# Patient Record
Sex: FEMALE | Race: WHITE | URBAN - METROPOLITAN AREA
[De-identification: names, ages, dates, MRNs, and addresses within clinical notes are randomized per-mention and may not be internally consistent; named-entity substitution may affect disease eponyms.]

---

## 2021-03-31 LAB — HBA1C MFR BLD HPLC: 8.6 %

## 2021-08-18 ENCOUNTER — OFFICE VISIT (OUTPATIENT)
Dept: ENDOCRINOLOGY | Age: 71
End: 2021-08-18
Payer: MEDICARE

## 2021-08-18 VITALS
HEIGHT: 61 IN | SYSTOLIC BLOOD PRESSURE: 115 MMHG | DIASTOLIC BLOOD PRESSURE: 60 MMHG | TEMPERATURE: 97.3 F | OXYGEN SATURATION: 97 % | HEART RATE: 86 BPM | BODY MASS INDEX: 49.84 KG/M2 | WEIGHT: 264 LBS | RESPIRATION RATE: 22 BRPM

## 2021-08-18 DIAGNOSIS — E78.2 MIXED HYPERLIPIDEMIA: ICD-10-CM

## 2021-08-18 DIAGNOSIS — Z79.4 TYPE 2 DIABETES MELLITUS WITH HYPERGLYCEMIA, WITH LONG-TERM CURRENT USE OF INSULIN (HCC): Primary | ICD-10-CM

## 2021-08-18 DIAGNOSIS — I10 ESSENTIAL HYPERTENSION: ICD-10-CM

## 2021-08-18 DIAGNOSIS — E11.65 TYPE 2 DIABETES MELLITUS WITH HYPERGLYCEMIA, WITH LONG-TERM CURRENT USE OF INSULIN (HCC): Primary | ICD-10-CM

## 2021-08-18 LAB
ALBUMIN SERPL-MCNC: 3.1 G/DL (ref 3.5–5)
ALBUMIN/GLOB SERPL: 0.9 {RATIO} (ref 1.1–2.2)
ALP SERPL-CCNC: 125 U/L (ref 45–117)
ALT SERPL-CCNC: 14 U/L (ref 12–78)
ANION GAP SERPL CALC-SCNC: 5 MMOL/L (ref 5–15)
AST SERPL-CCNC: 7 U/L (ref 15–37)
BILIRUB SERPL-MCNC: 0.2 MG/DL (ref 0.2–1)
BUN SERPL-MCNC: 30 MG/DL (ref 6–20)
BUN/CREAT SERPL: 13 (ref 12–20)
CALCIUM SERPL-MCNC: 9.1 MG/DL (ref 8.5–10.1)
CHLORIDE SERPL-SCNC: 104 MMOL/L (ref 97–108)
CHOLEST SERPL-MCNC: 168 MG/DL
CO2 SERPL-SCNC: 27 MMOL/L (ref 21–32)
CREAT SERPL-MCNC: 2.28 MG/DL (ref 0.55–1.02)
CREAT UR-MCNC: 248 MG/DL
GLOBULIN SER CALC-MCNC: 3.6 G/DL (ref 2–4)
GLUCOSE SERPL-MCNC: 227 MG/DL (ref 65–100)
HBA1C MFR BLD HPLC: 7.2 %
HDLC SERPL-MCNC: 53 MG/DL
HDLC SERPL: 3.2 {RATIO} (ref 0–5)
LDLC SERPL CALC-MCNC: 68.4 MG/DL (ref 0–100)
MICROALBUMIN UR-MCNC: 6.17 MG/DL
MICROALBUMIN/CREAT UR-RTO: 25 MG/G (ref 0–30)
POTASSIUM SERPL-SCNC: 4.8 MMOL/L (ref 3.5–5.1)
PROT SERPL-MCNC: 6.7 G/DL (ref 6.4–8.2)
SODIUM SERPL-SCNC: 136 MMOL/L (ref 136–145)
TRIGL SERPL-MCNC: 233 MG/DL (ref ?–150)
VLDLC SERPL CALC-MCNC: 46.6 MG/DL

## 2021-08-18 PROCEDURE — 3051F HG A1C>EQUAL 7.0%<8.0%: CPT | Performed by: INTERNAL MEDICINE

## 2021-08-18 PROCEDURE — 83036 HEMOGLOBIN GLYCOSYLATED A1C: CPT | Performed by: INTERNAL MEDICINE

## 2021-08-18 PROCEDURE — G8432 DEP SCR NOT DOC, RNG: HCPCS | Performed by: INTERNAL MEDICINE

## 2021-08-18 PROCEDURE — G8427 DOCREV CUR MEDS BY ELIG CLIN: HCPCS | Performed by: INTERNAL MEDICINE

## 2021-08-18 PROCEDURE — G8417 CALC BMI ABV UP PARAM F/U: HCPCS | Performed by: INTERNAL MEDICINE

## 2021-08-18 PROCEDURE — 1090F PRES/ABSN URINE INCON ASSESS: CPT | Performed by: INTERNAL MEDICINE

## 2021-08-18 PROCEDURE — 3017F COLORECTAL CA SCREEN DOC REV: CPT | Performed by: INTERNAL MEDICINE

## 2021-08-18 PROCEDURE — G8400 PT W/DXA NO RESULTS DOC: HCPCS | Performed by: INTERNAL MEDICINE

## 2021-08-18 PROCEDURE — 99204 OFFICE O/P NEW MOD 45 MIN: CPT | Performed by: INTERNAL MEDICINE

## 2021-08-18 PROCEDURE — G8536 NO DOC ELDER MAL SCRN: HCPCS | Performed by: INTERNAL MEDICINE

## 2021-08-18 PROCEDURE — 1101F PT FALLS ASSESS-DOCD LE1/YR: CPT | Performed by: INTERNAL MEDICINE

## 2021-08-18 PROCEDURE — 2022F DILAT RTA XM EVC RTNOPTHY: CPT | Performed by: INTERNAL MEDICINE

## 2021-08-18 RX ORDER — NATEGLINIDE 120 MG/1
120 TABLET ORAL
Qty: 90 TABLET | Refills: 6 | Status: SHIPPED | OUTPATIENT
Start: 2021-08-18 | End: 2022-09-14 | Stop reason: SDUPTHER

## 2021-08-18 RX ORDER — INSULIN GLARGINE 300 U/ML
60 INJECTION, SOLUTION SUBCUTANEOUS
Qty: 4.5 ML | Refills: 6 | Status: SHIPPED | OUTPATIENT
Start: 2021-08-18 | End: 2021-11-22

## 2021-08-18 RX ORDER — ALLOPURINOL 100 MG/1
100 TABLET ORAL DAILY
COMMUNITY
Start: 2021-08-04

## 2021-08-18 RX ORDER — GABAPENTIN 400 MG/1
CAPSULE ORAL
COMMUNITY
Start: 2021-08-04 | End: 2022-05-13 | Stop reason: ALTCHOICE

## 2021-08-18 RX ORDER — TRAMADOL HYDROCHLORIDE 50 MG/1
50 TABLET ORAL
COMMUNITY
End: 2021-11-22

## 2021-08-18 RX ORDER — METOPROLOL SUCCINATE 50 MG/1
75 TABLET, EXTENDED RELEASE ORAL DAILY
COMMUNITY
Start: 2021-08-04

## 2021-08-18 RX ORDER — LISINOPRIL 2.5 MG/1
2.5 TABLET ORAL DAILY
COMMUNITY
Start: 2021-08-04

## 2021-08-18 RX ORDER — FUROSEMIDE 20 MG/1
20 TABLET ORAL AS NEEDED
COMMUNITY
End: 2022-05-13

## 2021-08-18 RX ORDER — INSULIN GLARGINE 300 U/ML
72 INJECTION, SOLUTION SUBCUTANEOUS
COMMUNITY
End: 2021-08-18 | Stop reason: SDUPTHER

## 2021-08-18 RX ORDER — SENNOSIDES 8.6 MG/1
1 TABLET ORAL
COMMUNITY

## 2021-08-18 RX ORDER — GABAPENTIN 300 MG/1
CAPSULE ORAL
COMMUNITY
Start: 2021-08-04

## 2021-08-18 NOTE — PROGRESS NOTES
[facility]  Mike Johnston MD, Vinay Mora is a 73 y.o. female presents today as a new DM Patient. HPI    Patient here for initial visit of Type 2 diabetes mellitus . Referred : by pcp    H/o diabetes for several  years     Current A1C is  9%  and symptoms/problems include polyuria and polydipsia     Current diabetic medications include toujeo 72 units a day and victoza 1.8 mg   Daily . Current monitoring regimen: rarely  Home blood sugar records: trend: fluctuating a lot  Any episodes of hypoglycemia? yes -       Past Medical History:   Diagnosis Date    [facility] ([facility]     Diabetes ([facility]     Hypertension     Lumbago with sciatica     Mixed hyperlipidemia     Osteoarthritis of hip     Ventral hernia      Past Surgical History:   Procedure Laterality Date    HX  SECTION       Current Outpatient Medications   Medication Sig    allopurinoL (ZYLOPRIM) 100 mg tablet Take 100 mg by mouth daily.  gabapentin (NEURONTIN) 300 mg capsule TAKE ONE CAPSULE BY MOUTH THREE TIMES DAILY    gabapentin (NEURONTIN) 400 mg capsule TAKE ONE CAPSULE BY MOUTH AT BEDTIME    metoprolol succinate (TOPROL-XL) 50 mg XL tablet Take 50 mg by mouth daily.  lisinopriL (PRINIVIL, ZESTRIL) 2.5 mg tablet Take 2.5 mg by mouth daily.  furosemide (LASIX) 20 mg tablet Take 20 mg by mouth as needed.  senna (Senna) 8.6 mg tablet Take 1 Tablet by mouth nightly.  insulin glargine U-300 conc (Toujeo SoloStar U-300 Insulin) 300 unit/mL (1.5 mL) inpn pen 72 Units by SubCUTAneous route nightly.  liraglutide (VICTOZA) 0.6 mg/0.1 mL (18 mg/3 mL) pnij 1.8 mg by SubCUTAneous route nightly.  traMADoL (ULTRAM) 50 mg tablet Take 50 mg by mouth every six (6) hours as needed for Pain.  apixaban (Eliquis) 5 mg tablet Take 5 mg by mouth two (2) times a day. No current facility-administered medications for this visit.         Review of Systems   Constitutional: Positive for malaise/fatigue. HENT: Negative. Eyes: Negative for pain and redness. Respiratory: Negative. Cardiovascular: Negative for chest pain, palpitations and leg swelling. Gastrointestinal: Negative. Negative for constipation. Genitourinary: Negative. Musculoskeletal: Positive for joint pain. Negative for myalgias. Skin: Negative. Neurological: Negative. Endo/Heme/Allergies: Negative. Psychiatric/Behavioral: Negative for depression and memory loss. The patient does not have insomnia. Vitals:    08/18/21 0936   BP: 115/60   BP 1 Location: Left upper arm   BP Patient Position: Sitting   BP Cuff Size: Large adult   Pulse: 86   Temp: 97.3 °F (36.3 °C)   TempSrc: Temporal   Resp: 22   Height: 5' 1\" (1.549 m)   Weight: 264 lb (119.7 kg)   SpO2: 97%        Physical Exam  Constitutional:       Appearance: She is well-developed. HENT:      Head: Normocephalic and atraumatic. Eyes:      Conjunctiva/sclera: Conjunctivae normal.      Pupils: Pupils are equal, round, and reactive to light. Cardiovascular:      Rate and Rhythm: Normal rate and regular rhythm. Pulmonary:      Effort: Pulmonary effort is normal.      Breath sounds: Normal breath sounds. Abdominal:      General: Bowel sounds are normal.      Palpations: Abdomen is soft. Musculoskeletal:         General: Normal range of motion. Cervical back: Normal range of motion and neck supple. Comments: Uses the walker    Skin:     General: Skin is warm and dry. Comments: Has acanthosis on neck and under arms   Neurological:      Mental Status: She is alert and oriented to person, place, and time. Deep Tendon Reflexes: Reflexes are normal and symmetric. [x] Recent labs have been reviewed from referring provider. [] Recent labs were not available at time of visit. Assessment and  Plan     1.  Type 2 DM uncontrolled :  a1c is 7.2  %  Today    Compared to   9  %   From  Before Likely from low sugars as she skips lunches     Explained about proper meal times   Stay on victoza @ 1.8 mg a day    Take  Toujeo   insulin  60   units  at bed time  Start on   starlix  120 mg right before meals     Patient is advised to check blood sugars 2 times daily by rotation method. reviewed medications and side effects in detail  lab results and schedule of future lab studies reviewed with patient    specific diabetic recommendations: diabetic diet discussed in detail, written exchange diet given, low cholesterol diet, weight control and daily exercise discussed, home glucose monitoring emphasized, all medications, side effects and compliance discussed carefully, use and side effects of insulin is taught, foot care discussed and Podiatry visits discussed, annual eye examinations at Ophthalmology discussed, glycohemoglobin and other lab monitoring discussed, patient urged in the strongest terms to quit smoking and labs immediately prior to next visit    2. Hypoglycemia :  Educated on treating the hypoglycemia. 3. HTN : continue current care. Patient is educated about importance of compliance with anti-hypertensives especially ARB/ACEI    4. Dyslipidemia : continue current meds. Patient is educated about benefits and adverse effects of statins and explained how benefits outweigh risk. 5. use of aspirin to prevent MI and TIA's discussed      6. Diabetic complications :     A. Retinopathy-NO   educated on this complication,  regular f/u with ophthalmologist encouraged    B. Nephropathy - YES    educated on this disease and indicated stages and its prognosis. Regular care with nephrologist encouraged    C.  Peripheral Neuropathy - YES  , mild  educated on this disease and indicated improvement with good and stable glycemic control        Reviewed results with patient and discussed the labs being ordered today/bnv  Patient voiced understanding of plan of care

## 2021-08-18 NOTE — PATIENT INSTRUCTIONS
SPECIFIC INSTRUCTIONS BELOW         DRINK water   Do not skip meals  Do not eat in between meals    Reduce carbs- pasta, rice, potatoes, bread   Try to avoid processed bread products like BISCUITS, CROISSANTS, MUFFINS    Do not drink juices or sodas, even if they are calorie zero or diet drinks and especially avoid using powders like crystalloids , STACEY-AIDS     Do not eat peanut butter     Do not eat sugar free cookies and cakes   Do not eat peaches, oranges, pineapples, raisins, grapes , canteloupe , honey dew, mangoes , watermelon  and fruit medleys      ----------------------------------------------------------------------------------------------------      Check blood sugars immediately before each meal and at bedtime      Stay on victoza @ 1.8 mg a day      Eat lunch definitely       Take  Toujeo   insulin  60   units  at bed time    Start on   starlix  120 mg right before meals      Less than 70 mg NO INSULIN          -------------PAY ATTENTION TO THESE GENERAL INSTRUCTIONS -----------------    -ANY tests other than blood work, which you opt to do  outside the  [facility] facilities, you are responsible for prior authorizations if  required    - HEALTH MAINTENANCE IS NOT GOING TO BE UP TO DATE ON YOUR AVS- PLEASE IGNORE   - YOUR MED LIST IS NOT UP TO DATE AS SOME CHANGES ARE BEING MADE AFTER THE VISIT - FOLLOW SPECIFIC INSTRUCTIONS  ABOVE     Results     *Normal results will not be notified by a phone call starting January 1 2021   *If you have an upcoming visit, the results will be discussed at the visit   *Please sign up for MY CHART if you want access to your lab and test results  *Abnormal results which require immediate attention will be notified by phone call   *Abnormal results which do not require immediate assistance will be notified in 1-2 weeks       Refills    -    have your pharmacy send us a refill request . Refills are done max for one year and a visit is a must before refills are extended    Follow up appointments -  highly encourage you to make it when you are checking out. We can accommodate you into the schedule based on your clinical situation, but not for extending refills beyond a year. Labs are important to give refills and is important to get labs before the visit     Phone calls  -  Allow  24 hrs.  for non-urgent calls to be returned  Prior authorization - It may take 2-4 weeks to process  Forms  -  FMLA, DMV etc., will take up to 2 weeks to process  Cancellations - please notify the office 2 days in advance   Samples  - will only be dispensed at visits       If not showing for the appointments and cancelling appointments within 24 hours are kept track of and three  of such situations in  two consecutive years will likely be considered for termination from the practice    -------------------------------------------------------------------------------------------------------------------

## 2021-08-18 NOTE — PROGRESS NOTES
Viri Trevizo is a 73 y.o. female here for   Chief Complaint   Patient presents with    New Patient     referred for DM       1. Have you been to the ER, urgent care clinic since your last visit? Hospitalized since your last visit? -n/a    2. Have you seen or consulted any other health care providers outside of the [address] [place] [address] since your last visit?   Include any pap smears or colon screening.-n/a

## 2021-08-18 NOTE — LETTER
8/23/2021    Patient: Reina Pulliam   YOB: 1950   Date of Visit: 8/18/2021     Sosa Nieves MD  [address] [facility] [address]  Via Fax: [phone]    Dear Sosa Nieves MD,      Thank you for referring Ms. Reina Pulliam to [address] [place] [address] for evaluation. My notes for this consultation are attached. If you have questions, please do not hesitate to call me. I look forward to following your patient along with you.       Sincerely,    Neil Tao MD

## 2021-08-27 NOTE — PROGRESS NOTES
Patient states she does not have a kidney doctor. Patient states she saw Dr. Oniel Hernandez once about a year ago.

## 2021-11-22 ENCOUNTER — OFFICE VISIT (OUTPATIENT)
Dept: ENDOCRINOLOGY | Age: 71
End: 2021-11-22
Payer: MEDICARE

## 2021-11-22 VITALS
OXYGEN SATURATION: 97 % | TEMPERATURE: 96.7 F | WEIGHT: 244.8 LBS | BODY MASS INDEX: 46.22 KG/M2 | HEIGHT: 61 IN | HEART RATE: 72 BPM

## 2021-11-22 DIAGNOSIS — E78.2 MIXED HYPERLIPIDEMIA: ICD-10-CM

## 2021-11-22 DIAGNOSIS — N18.32 STAGE 3B CHRONIC KIDNEY DISEASE (HCC): ICD-10-CM

## 2021-11-22 DIAGNOSIS — E11.65 TYPE 2 DIABETES MELLITUS WITH HYPERGLYCEMIA, WITH LONG-TERM CURRENT USE OF INSULIN (HCC): Primary | ICD-10-CM

## 2021-11-22 DIAGNOSIS — I10 ESSENTIAL HYPERTENSION: ICD-10-CM

## 2021-11-22 DIAGNOSIS — Z79.4 TYPE 2 DIABETES MELLITUS WITH HYPERGLYCEMIA, WITH LONG-TERM CURRENT USE OF INSULIN (HCC): Primary | ICD-10-CM

## 2021-11-22 LAB — HBA1C MFR BLD HPLC: 6.3 %

## 2021-11-22 PROCEDURE — 99215 OFFICE O/P EST HI 40 MIN: CPT | Performed by: INTERNAL MEDICINE

## 2021-11-22 PROCEDURE — 1090F PRES/ABSN URINE INCON ASSESS: CPT | Performed by: INTERNAL MEDICINE

## 2021-11-22 PROCEDURE — G8417 CALC BMI ABV UP PARAM F/U: HCPCS | Performed by: INTERNAL MEDICINE

## 2021-11-22 PROCEDURE — G8427 DOCREV CUR MEDS BY ELIG CLIN: HCPCS | Performed by: INTERNAL MEDICINE

## 2021-11-22 PROCEDURE — G8510 SCR DEP NEG, NO PLAN REQD: HCPCS | Performed by: INTERNAL MEDICINE

## 2021-11-22 PROCEDURE — 3017F COLORECTAL CA SCREEN DOC REV: CPT | Performed by: INTERNAL MEDICINE

## 2021-11-22 PROCEDURE — G8400 PT W/DXA NO RESULTS DOC: HCPCS | Performed by: INTERNAL MEDICINE

## 2021-11-22 PROCEDURE — 83036 HEMOGLOBIN GLYCOSYLATED A1C: CPT | Performed by: INTERNAL MEDICINE

## 2021-11-22 PROCEDURE — 2022F DILAT RTA XM EVC RTNOPTHY: CPT | Performed by: INTERNAL MEDICINE

## 2021-11-22 PROCEDURE — 1101F PT FALLS ASSESS-DOCD LE1/YR: CPT | Performed by: INTERNAL MEDICINE

## 2021-11-22 PROCEDURE — G8536 NO DOC ELDER MAL SCRN: HCPCS | Performed by: INTERNAL MEDICINE

## 2021-11-22 PROCEDURE — 3044F HG A1C LEVEL LT 7.0%: CPT | Performed by: INTERNAL MEDICINE

## 2021-11-22 RX ORDER — INSULIN GLARGINE 300 U/ML
INJECTION, SOLUTION SUBCUTANEOUS
Qty: 4.5 ML | Refills: 6 | Status: SHIPPED | OUTPATIENT
Start: 2021-11-22 | End: 2022-09-14 | Stop reason: SDUPTHER

## 2021-11-22 NOTE — PATIENT INSTRUCTIONS
SPECIFIC INSTRUCTIONS BELOW       Follow the steps  to get started on   freestyle Norma 2  for Medicare patients       1. Keep a log of blood sugars by checking 4 times a day    2. you must be on insulin shots at least 3 times a day   3. Contact one of the following  DME suppliers  and find out who accepts your plan   4.  Give that supplier  our  office info  and fax number       [employer]  is best  for  freestyle NORMA 2 - [phone] [facility] supplies   [phone]      [facility]   [phone]   ext  1 [facility]  [address] [place] [address]  [phone] [facility]   [phone] ( least preferred )       [facility]  [phone] ( [address] preferred )      [employer]   [phone]         ---------------------------------------------------------------------------------------------------------------------------------------------      DRINK water   Do not skip meals  Do not eat in between meals    Reduce carbs- pasta, rice, potatoes, bread   Try to avoid processed bread products like BISCUITS, CROISSANTS, MUFFINS    Do not drink juices or sodas, even if they are calorie zero or diet drinks and especially avoid using powders like crystalloids , STACEY-AIDS     Do not eat peanut butter     Do not eat sugar free cookies and cakes   Do not eat peaches, oranges, pineapples, raisins, grapes , canteloupe , honey dew, mangoes , watermelon  and fruit medleys                  ------------------------------------------------------------------------------------------------------      Check blood sugars immediately before each meal and at bedtime      Stay on victoza @ 1.8 mg a day      Eat lunch definitely       DECREASE   Toujeo   insulin  40   units  at bed time    Start on  Nateglinide  120 mg right before meals   Do not take it if you are not eating   Cut the pill to half if eating lesser than normal   Do not avoid lunches        Less than 70 mg NO INSULIN                -------------PAY ATTENTION TO THESE GENERAL INSTRUCTIONS -----------------      - The medications prescribed at this visit will not be available at pharmacy until 6 pm       - YOUR MED LIST IS NOT UP TO DATE AS SOME CHANGES ARE BEING MADE AFTER THE VISIT - FOLLOW SPECIFIC INSTRUCTIONS  ABOVE     -ANY tests other than blood work, which you opt to do  outside the  [facility] imaging facilities, you are responsible for prior authorizations if  required    - [phone] [facility]- PLEASE IGNORE     Results     *Normal results will not be notified by a phone call starting January 1 2021   *If you have an upcoming visit, the results will be discussed at the visit   *Please sign up for MY CHART if you want access to your lab and test results  *Abnormal results which require immediate attention will be notified by phone call   *Abnormal results which do not require immediate assistance will be notified in 1-2 weeks       Refills    -    have your pharmacy send us a refill request . Refills are done max for one year and a visit is a must before refills are extended    Follow up appointments -  highly encourage you to make it when you are checking out. We can accommodate you into the schedule based on your clinical situation, but not for extending refills beyond a year. Labs are important to give refills and is important to get labs before the visit     Phone calls  -  Allow  24 hrs.  for non-urgent calls to be returned  Prior authorization - It may take 2-4 weeks to process  Forms  -  FMLA, DMV etc., will take up to 2 weeks to process  Cancellations - please notify the office 2 days in advance   Samples  - will only be dispensed at visits       If not showing for the appointments and cancelling appointments within 24 hours are kept track of and three  of such situations in  two consecutive years will likely be considered for termination from the practice    -------------------------------------------------------------------------------------------------------------------

## 2021-11-22 NOTE — LETTER
11/23/2021    Patient: Richar Lu   YOB: 1950   Date of Visit: 11/22/2021     Attila Richardson MD  [address] [facility] [address]  Via Fax: [phone]    Dear Attila Richardson MD,      Thank you for referring Ms. Richar Lu to [address] [place] [address] for evaluation. My notes for this consultation are attached. If you have questions, please do not hesitate to call me. I look forward to following your patient along with you.       Sincerely,    Imelda Cardoza MD

## 2021-11-22 NOTE — PROGRESS NOTES
[facility]  [place] Kurt LIU, Nadege Fofana is a 73 y.o. female presents today as a follow up     Patient here for  FIRST FOLLOW UP VISIT  after initial visit of Type 2 diabetes mellitus  From  August 2021       She had Many hypoglycemic   Episodes ( some went unrecognized )   She  DISLIKES CHECKING SUGARS   No meter in hand     She expresses interest in eating muffins and  PNB      LOST  20 LBS           August 2021    Referred : by pcp  H/o diabetes for several  years   Current A1C is  9%  and symptoms/problems include polyuria and polydipsia   Current diabetic medications include toujeo 72 units a day and victoza 1.8 mg   Daily . Current monitoring regimen: rarely  Home blood sugar records: trend: fluctuating a lot  Any episodes of hypoglycemia? yes -        Vitals:    11/22/21 1042   Pulse: 72   Temp: (!) 96.7 °F (35.9 °C)   TempSrc: Temporal   Height: 5' 1\" (1.549 m)   Weight: 244 lb 12.8 oz (111 kg)   SpO2: 97%      Review of Systems   None       Physical Exam   Constitutional: She is oriented to person, place, and time. She appears well-developed and well-nourished. She is using the walker   Psychiatric: She has a normal mood and affect. Lab Results   Component Value Date/Time    Glucose 227 (H) 08/18/2021 10:52 AM    Microalbumin/Creat ratio (mg/g creat) 25 08/18/2021 10:52 AM    Microalbumin,urine random 6.17 08/18/2021 10:52 AM    LDL, calculated 68.4 08/18/2021 10:52 AM    Creatinine 2.28 (H) 08/18/2021 10:52 AM      Lab Results   Component Value Date/Time    Cholesterol, total 168 08/18/2021 10:52 AM    HDL Cholesterol 53 08/18/2021 10:52 AM    LDL, calculated 68.4 08/18/2021 10:52 AM    Triglyceride 233 (H) 08/18/2021 10:52 AM    CHOL/HDL Ratio 3.2 08/18/2021 10:52 AM     Lab Results   Component Value Date/Time    ALT (SGPT) 14 08/18/2021 10:52 AM    Alk.  phosphatase 125 (H) 08/18/2021 10:52 AM    Bilirubin, total 0.2 08/18/2021 10:52 AM    Albumin 3.1 (L) 08/18/2021 10:52 AM    Protein, total 6.7 08/18/2021 10:52 AM     Lab Results   Component Value Date/Time    GFR est non-AA 21 (L) 08/18/2021 10:52 AM    GFR est AA 26 (L) 08/18/2021 10:52 AM    Creatinine 2.28 (H) 08/18/2021 10:52 AM    BUN 30 (H) 08/18/2021 10:52 AM    Sodium 136 08/18/2021 10:52 AM    Potassium 4.8 08/18/2021 10:52 AM    Chloride 104 08/18/2021 10:52 AM    CO2 27 08/18/2021 10:52 AM           Assessment and  Plan       1. Severe hypoglycemias  :  Despite decreasing TOUJEO dose and  Renal function being good     Etiology :   Likely  Changing From being chaotic in food habits  to being better side in life style   Catching up with meds when it is not the right time   Not checkign sugars at all     Educated her to  be consistent with her Lifestyle   Educated her to take our help after office hours if need be        2. Type 2 DM uncontrolled :  a1c is  6.3 %  By POC     Compared to   7.2  %  August 2021     Compared to   9  %   From  Before     November 2021     Too MANY LOW SUGARS  And that makes the a1c look better falsely   She dislikes to check sugars   Suggesting use of CGM ( afraid of technical problems )    Stay on victoza @ 1.8 mg a day    DECREASE  Toujeo   insulin  40    units  at bed time  Stay starlix  120 mg right before meals  She has lived a chaotic life and now with counseling she is doing a lot better and the meds seem higher         August 2021     Likely from low sugars as she skips lunches   Explained about proper meal times   Stay on victoza @ 1.8 mg a day    Take  Toujeo   insulin  60   units  at bed time  Start on   starlix  120 mg right before meals       2. Hypoglycemia :  Educated on treating the hypoglycemia. 3. HTN : continue current care. 4.  Dyslipidemia : continue current meds. 5. use of aspirin to prevent MI and TIA's discussed      6. Diabetic complications :     A.  Retinopathy-NO   educated on this complication,  regular f/u with ophthalmologist encouraged    B. Nephropathy - YES  , Dr. Machelle Peacock is better  - egfr is  33 ml /min      Creat is 1.5 compared to 2.2 in august 2021     C.  Peripheral Neuropathy - YES  , mild  educated on this disease and indicated improvement with good and stable glycemic control      Reviewed the diet and changes and spent more than 25 min in interpretation and counseling     Total time  : 42 min     Reviewed results with patient and discussed the labs being ordered today/bnv  Patient voiced understanding of plan of care

## 2022-05-13 ENCOUNTER — OFFICE VISIT (OUTPATIENT)
Dept: ENDOCRINOLOGY | Age: 72
End: 2022-05-13
Payer: MEDICARE

## 2022-05-13 VITALS
HEART RATE: 80 BPM | TEMPERATURE: 96.6 F | BODY MASS INDEX: 45.2 KG/M2 | OXYGEN SATURATION: 99 % | WEIGHT: 239.4 LBS | HEIGHT: 61 IN | SYSTOLIC BLOOD PRESSURE: 134 MMHG | DIASTOLIC BLOOD PRESSURE: 60 MMHG

## 2022-05-13 DIAGNOSIS — E78.2 MIXED HYPERLIPIDEMIA: ICD-10-CM

## 2022-05-13 DIAGNOSIS — N18.32 STAGE 3B CHRONIC KIDNEY DISEASE (HCC): ICD-10-CM

## 2022-05-13 DIAGNOSIS — Z79.4 TYPE 2 DIABETES MELLITUS WITH HYPERGLYCEMIA, WITH LONG-TERM CURRENT USE OF INSULIN (HCC): Primary | ICD-10-CM

## 2022-05-13 DIAGNOSIS — E55.9 VITAMIN D DEFICIENCY: ICD-10-CM

## 2022-05-13 DIAGNOSIS — E11.65 TYPE 2 DIABETES MELLITUS WITH HYPERGLYCEMIA, WITH LONG-TERM CURRENT USE OF INSULIN (HCC): Primary | ICD-10-CM

## 2022-05-13 DIAGNOSIS — I10 ESSENTIAL HYPERTENSION: ICD-10-CM

## 2022-05-13 LAB — HBA1C MFR BLD HPLC: 6.9 %

## 2022-05-13 PROCEDURE — 3044F HG A1C LEVEL LT 7.0%: CPT | Performed by: INTERNAL MEDICINE

## 2022-05-13 PROCEDURE — G8400 PT W/DXA NO RESULTS DOC: HCPCS | Performed by: INTERNAL MEDICINE

## 2022-05-13 PROCEDURE — 99214 OFFICE O/P EST MOD 30 MIN: CPT | Performed by: INTERNAL MEDICINE

## 2022-05-13 PROCEDURE — 3017F COLORECTAL CA SCREEN DOC REV: CPT | Performed by: INTERNAL MEDICINE

## 2022-05-13 PROCEDURE — 1101F PT FALLS ASSESS-DOCD LE1/YR: CPT | Performed by: INTERNAL MEDICINE

## 2022-05-13 PROCEDURE — G8754 DIAS BP LESS 90: HCPCS | Performed by: INTERNAL MEDICINE

## 2022-05-13 PROCEDURE — G8427 DOCREV CUR MEDS BY ELIG CLIN: HCPCS | Performed by: INTERNAL MEDICINE

## 2022-05-13 PROCEDURE — 83036 HEMOGLOBIN GLYCOSYLATED A1C: CPT | Performed by: INTERNAL MEDICINE

## 2022-05-13 PROCEDURE — G8510 SCR DEP NEG, NO PLAN REQD: HCPCS | Performed by: INTERNAL MEDICINE

## 2022-05-13 PROCEDURE — G8417 CALC BMI ABV UP PARAM F/U: HCPCS | Performed by: INTERNAL MEDICINE

## 2022-05-13 PROCEDURE — G8752 SYS BP LESS 140: HCPCS | Performed by: INTERNAL MEDICINE

## 2022-05-13 PROCEDURE — G8536 NO DOC ELDER MAL SCRN: HCPCS | Performed by: INTERNAL MEDICINE

## 2022-05-13 PROCEDURE — 1090F PRES/ABSN URINE INCON ASSESS: CPT | Performed by: INTERNAL MEDICINE

## 2022-05-13 PROCEDURE — 2022F DILAT RTA XM EVC RTNOPTHY: CPT | Performed by: INTERNAL MEDICINE

## 2022-05-13 RX ORDER — FLUTICASONE PROPIONATE 50 MCG
SPRAY, SUSPENSION (ML) NASAL
COMMUNITY
Start: 2022-04-07 | End: 2022-05-13 | Stop reason: ALTCHOICE

## 2022-05-13 RX ORDER — BUMETANIDE 0.5 MG/1
0.5 TABLET ORAL DAILY
COMMUNITY
Start: 2022-04-13

## 2022-05-13 RX ORDER — ROSUVASTATIN CALCIUM 20 MG/1
TABLET, COATED ORAL
COMMUNITY
Start: 2022-04-19

## 2022-05-13 RX ORDER — CETIRIZINE HCL 10 MG
10 TABLET ORAL EVERY EVENING
COMMUNITY
Start: 2022-04-07

## 2022-05-13 NOTE — LETTER
5/15/2022    Patient: Parveen Madrigal   YOB: 1950   Date of Visit: 5/13/2022     Carrol Oliveira MD  [address] [place] [id number]  Via Fax: [phone]    Dear Carrol Oliveira MD,      Thank you for referring Ms. Parveen Madrigal to [address] [place] [address] for evaluation. My notes for this consultation are attached. If you have questions, please do not hesitate to call me. I look forward to following your patient along with you.       Sincerely,    Juan Alberto Villa MD

## 2022-05-13 NOTE — PATIENT INSTRUCTIONS
SPECIFIC INSTRUCTIONS BELOW        DRINK water   Do not skip meals  Do not eat in between meals    Reduce carbs- pasta, rice, potatoes, bread   Try to avoid processed bread products like BISCUITS, CROISSANTS, MUFFINS    Do not drink juices or sodas, even if they are calorie zero or diet drinks and especially avoid using powders like crystalloids , STACEY-AIDS     Do not eat peanut butter     Do not eat sugar free cookies and cakes   Do not eat peaches, oranges, pineapples, raisins, grapes , canteloupe , honey dew, mangoes , watermelon  and fruit medleys    ------------------------------------------------------------------------------------------------------        Follow the steps  to get started on freestyle Norma 2  for Medicare patients       1. Keep a log of blood sugars by checking 4 times a day    2. you must be on insulin shots at least 3 times a day   3. Contact one of the following  DME suppliers  and find out who accepts your plan   4.  Give that supplier  our  office info  and fax number       [employer]  is best  for  freestyle NORMA 2 - [phone] [facility] ( [address] preferred )      ----------------------------------------------------------------------------------------------------      Check blood sugars immediately before each meal and at bedtime      Start on ozempic   At   0.25  Mg    For 3 weeks   And   Increase    0. 5 mg   Once  A week  ( pt has sample from pcp )    stop vicotza  For cost  reasons        Toujeo   insulin  40   units  at bed time    Stay  on  Nateglinide  120 mg right before meals   Do not take it if you are not eating   Cut the pill to half if eating lesser than normal   Do not avoid lunches        Less than 70 mg NO INSULIN          -------------PAY ATTENTION TO THESE GENERAL INSTRUCTIONS -----------------      - The medications prescribed at this visit will not be available at pharmacy until 6 pm       - YOUR MED LIST IS NOT UP TO DATE AS SOME CHANGES ARE BEING MADE AFTER THE VISIT - FOLLOW SPECIFIC INSTRUCTIONS  ABOVE     -ANY tests other than blood work, which you opt to do  outside the  [facility] imaging facilities, you are responsible for prior authorizations if  required    - [address] UP TO DATE ON YOUR AVS- PLEASE IGNORE     Results     *Normal results will not be notified by a phone call starting January 1 2021   *If you have an upcoming visit, the results will be discussed at the visit   *Please sign up for MY CHART if you want access to your lab and test results  *Abnormal results which require immediate attention will be notified by phone call   *Abnormal results which do not require immediate assistance will be notified in 1-2 weeks       Refills    -    have your pharmacy send us a refill request . Refills are done max for one year and a visit is a must before refills are extended    Follow up appointments -  highly encourage you to make it when you are checking out. We can accommodate you into the schedule based on your clinical situation, but not for extending refills beyond a year. Labs are important to give refills and is important to get labs before the visit     Phone calls  -  Allow  24 hrs.  for non-urgent calls to be returned  Prior authorization - It may take 2-4 weeks to process  Forms  -  FMLA, DMV etc., will take up to 2 weeks to process  Cancellations - please notify the office 2 days in advance   Samples  - will only be dispensed at visits       If not showing for the appointments and cancelling appointments within 24 hours are kept track of and three  of such situations in  two consecutive years will likely be considered for termination from the practice    -------------------------------------------------------------------------------------------------------------------

## 2022-05-13 NOTE — PROGRESS NOTES
[facility]  Devi Ortiz MD, Ann Carballo is a 73 y.o. female presents today as a follow up     Patient here for  FOLLOW UP VISIT  after last  visit  For  Type 2 diabetes mellitus  From  November 2021     No meter   No labs         Nov 2021     She had Many hypoglycemic   Episodes ( some went unrecognized )   She  DISLIKES CHECKING SUGARS   No meter in hand   She expresses interest in eating muffins and  PNB  LOST  20 LBS           August 2021    Referred : by pcp  H/o diabetes for several  years   Current A1C is  9%  and symptoms/problems include polyuria and polydipsia   Current diabetic medications include toujeo 72 units a day and victoza 1.8 mg   Daily . Current monitoring regimen: rarely  Home blood sugar records: trend: fluctuating a lot  Any episodes of hypoglycemia? yes -        Review of Systems   None       Physical Exam   Constitutional: She is oriented to person, place, and time. She appears well-developed and well-nourished. She is using the walker   Psychiatric: She has a normal mood and affect. Lab Results   Component Value Date/Time    Glucose 227 (H) 08/18/2021 10:52 AM    Microalbumin/Creat ratio (mg/g creat) 25 08/18/2021 10:52 AM    Microalbumin,urine random 6.17 08/18/2021 10:52 AM    LDL, calculated 68.4 08/18/2021 10:52 AM    Creatinine 2.28 (H) 08/18/2021 10:52 AM      Lab Results   Component Value Date/Time    Cholesterol, total 168 08/18/2021 10:52 AM    HDL Cholesterol 53 08/18/2021 10:52 AM    LDL, calculated 68.4 08/18/2021 10:52 AM    Triglyceride 233 (H) 08/18/2021 10:52 AM    CHOL/HDL Ratio 3.2 08/18/2021 10:52 AM     Lab Results   Component Value Date/Time    ALT (SGPT) 14 08/18/2021 10:52 AM    Alk.  phosphatase 125 (H) 08/18/2021 10:52 AM    Bilirubin, total 0.2 08/18/2021 10:52 AM    Albumin 3.1 (L) 08/18/2021 10:52 AM    Protein, total 6.7 08/18/2021 10:52 AM     Lab Results   Component Value Date/Time    GFR est non-AA 21 (L) 08/18/2021 10:52 AM    GFR est AA 26 (L) 08/18/2021 10:52 AM    Creatinine 2.28 (H) 08/18/2021 10:52 AM    BUN 30 (H) 08/18/2021 10:52 AM    Sodium 136 08/18/2021 10:52 AM    Potassium 4.8 08/18/2021 10:52 AM    Chloride 104 08/18/2021 10:52 AM    CO2 27 08/18/2021 10:52 AM           Assessment and  Plan       1. 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 Patient  uses  Subcutaneous  ONE    Insulin  shot to treat  diabetes. Patient does adjustments to the regimen by sliding scale or bolus wizard  on the basis of therapeutic CGM testing results          November 2021     Too MANY LOW SUGARS  And that makes the a1c look better falsely   She dislikes to check sugars   Suggesting use of CGM ( afraid of technical problems )    Stay on victoza @ 1.8 mg a day    DECREASE  Toujeo   insulin  40    units  at bed time  Stay starlix  120 mg right before meals  She has lived a chaotic life and now with counseling she is doing a lot better and the meds seem higher         August 2021     Likely from low sugars as she skips lunches   Explained about proper meal times   Stay on victoza @ 1.8 mg a day    Take  Toujeo   insulin  60   units  at bed time  Start on   starlix  120 mg right before meals       2. Hypoglycemia :  Educated on treating the hypoglycemia. No low sugars at all since changes made     3. HTN : continue current care. 4.  Dyslipidemia : continue current meds. 5. use of aspirin to prevent MI and TIA's discussed      6. Diabetic complications :     A.  Retinopathy-NO   educated on this complication, regular f/u with ophthalmologist encouraged    B. Nephropathy - YES  , Dr. Isabel Green is better  - egfr is  33 ml /min   Creat is 1.5 compared to 2.2 in august 2021      C.  Peripheral Neuropathy - YES  , mild  educated on this disease and indicated improvement with good and stable glycemic control        Labs today     Reviewed results with patient and discussed the labs being ordered today/bnv  Patient voiced understanding of plan of care

## 2022-05-14 LAB
25(OH)D3 SERPL-MCNC: <9 NG/ML (ref 30–100)
ALBUMIN SERPL-MCNC: 3.5 G/DL (ref 3.5–5)
ALBUMIN/GLOB SERPL: 0.9 {RATIO} (ref 1.1–2.2)
ALP SERPL-CCNC: 133 U/L (ref 45–117)
ALT SERPL-CCNC: 15 U/L (ref 12–78)
ANION GAP SERPL CALC-SCNC: 6 MMOL/L (ref 5–15)
AST SERPL-CCNC: 15 U/L (ref 15–37)
BILIRUB SERPL-MCNC: 0.3 MG/DL (ref 0.2–1)
BUN SERPL-MCNC: 27 MG/DL (ref 6–20)
BUN/CREAT SERPL: 14 (ref 12–20)
CALCIUM SERPL-MCNC: 9.2 MG/DL (ref 8.5–10.1)
CHLORIDE SERPL-SCNC: 103 MMOL/L (ref 97–108)
CHOLEST SERPL-MCNC: 175 MG/DL
CO2 SERPL-SCNC: 30 MMOL/L (ref 21–32)
CREAT SERPL-MCNC: 1.95 MG/DL (ref 0.55–1.02)
CREAT UR-MCNC: 80.9 MG/DL
EST. AVERAGE GLUCOSE BLD GHB EST-MCNC: 151 MG/DL
GLOBULIN SER CALC-MCNC: 3.7 G/DL (ref 2–4)
GLUCOSE SERPL-MCNC: 274 MG/DL (ref 65–100)
HBA1C MFR BLD: 6.9 % (ref 4–5.6)
HDLC SERPL-MCNC: 54 MG/DL
HDLC SERPL: 3.2 {RATIO} (ref 0–5)
LDLC SERPL CALC-MCNC: 56.6 MG/DL (ref 0–100)
MICROALBUMIN UR-MCNC: 4.47 MG/DL
MICROALBUMIN/CREAT UR-RTO: 55 MG/G (ref 0–30)
POTASSIUM SERPL-SCNC: 4.1 MMOL/L (ref 3.5–5.1)
PROT SERPL-MCNC: 7.2 G/DL (ref 6.4–8.2)
SODIUM SERPL-SCNC: 139 MMOL/L (ref 136–145)
TRIGL SERPL-MCNC: 322 MG/DL (ref ?–150)
VLDLC SERPL CALC-MCNC: 64.4 MG/DL

## 2022-09-14 ENCOUNTER — OFFICE VISIT (OUTPATIENT)
Dept: ENDOCRINOLOGY | Age: 72
End: 2022-09-14
Payer: MEDICARE

## 2022-09-14 VITALS
BODY MASS INDEX: 43.35 KG/M2 | HEART RATE: 83 BPM | WEIGHT: 229.6 LBS | HEIGHT: 61 IN | SYSTOLIC BLOOD PRESSURE: 116 MMHG | DIASTOLIC BLOOD PRESSURE: 63 MMHG | OXYGEN SATURATION: 95 % | TEMPERATURE: 96.7 F

## 2022-09-14 DIAGNOSIS — E11.65 TYPE 2 DIABETES MELLITUS WITH HYPERGLYCEMIA, WITH LONG-TERM CURRENT USE OF INSULIN (HCC): Primary | ICD-10-CM

## 2022-09-14 DIAGNOSIS — E78.2 MIXED HYPERLIPIDEMIA: ICD-10-CM

## 2022-09-14 DIAGNOSIS — I10 ESSENTIAL HYPERTENSION: ICD-10-CM

## 2022-09-14 DIAGNOSIS — Z79.4 TYPE 2 DIABETES MELLITUS WITH HYPERGLYCEMIA, WITH LONG-TERM CURRENT USE OF INSULIN (HCC): Primary | ICD-10-CM

## 2022-09-14 DIAGNOSIS — N18.32 STAGE 3B CHRONIC KIDNEY DISEASE (HCC): ICD-10-CM

## 2022-09-14 DIAGNOSIS — E55.9 VITAMIN D DEFICIENCY: ICD-10-CM

## 2022-09-14 PROCEDURE — 1123F ACP DISCUSS/DSCN MKR DOCD: CPT | Performed by: INTERNAL MEDICINE

## 2022-09-14 PROCEDURE — 3044F HG A1C LEVEL LT 7.0%: CPT | Performed by: INTERNAL MEDICINE

## 2022-09-14 PROCEDURE — 3017F COLORECTAL CA SCREEN DOC REV: CPT | Performed by: INTERNAL MEDICINE

## 2022-09-14 PROCEDURE — G8510 SCR DEP NEG, NO PLAN REQD: HCPCS | Performed by: INTERNAL MEDICINE

## 2022-09-14 PROCEDURE — G8427 DOCREV CUR MEDS BY ELIG CLIN: HCPCS | Performed by: INTERNAL MEDICINE

## 2022-09-14 PROCEDURE — 1090F PRES/ABSN URINE INCON ASSESS: CPT | Performed by: INTERNAL MEDICINE

## 2022-09-14 PROCEDURE — G8417 CALC BMI ABV UP PARAM F/U: HCPCS | Performed by: INTERNAL MEDICINE

## 2022-09-14 PROCEDURE — 2022F DILAT RTA XM EVC RTNOPTHY: CPT | Performed by: INTERNAL MEDICINE

## 2022-09-14 PROCEDURE — 1101F PT FALLS ASSESS-DOCD LE1/YR: CPT | Performed by: INTERNAL MEDICINE

## 2022-09-14 PROCEDURE — 99214 OFFICE O/P EST MOD 30 MIN: CPT | Performed by: INTERNAL MEDICINE

## 2022-09-14 PROCEDURE — G8536 NO DOC ELDER MAL SCRN: HCPCS | Performed by: INTERNAL MEDICINE

## 2022-09-14 PROCEDURE — G8752 SYS BP LESS 140: HCPCS | Performed by: INTERNAL MEDICINE

## 2022-09-14 PROCEDURE — G8754 DIAS BP LESS 90: HCPCS | Performed by: INTERNAL MEDICINE

## 2022-09-14 PROCEDURE — G8400 PT W/DXA NO RESULTS DOC: HCPCS | Performed by: INTERNAL MEDICINE

## 2022-09-14 RX ORDER — NATEGLINIDE 120 MG/1
120 TABLET ORAL
Qty: 270 TABLET | Refills: 3 | Status: SHIPPED | OUTPATIENT
Start: 2022-09-14

## 2022-09-14 RX ORDER — INSULIN GLARGINE 300 U/ML
INJECTION, SOLUTION SUBCUTANEOUS
Qty: 4.5 ML | Refills: 6 | Status: SHIPPED | OUTPATIENT
Start: 2022-09-14

## 2022-09-14 NOTE — PROGRESS NOTES
[facility]  Marshall Angulo MD, Ivy Ingram is a 81 y.o. female presents today as a follow up     Patient here for  FOLLOW UP VISIT  after last  visit  For  Type 2 diabetes mellitus  From  May 2022     LOST 10 lbs   She is feeling exhausted   She has nausea and constipation       May 2022      No meter , No labs     Nov 2021     She had Many hypoglycemic   Episodes ( some went unrecognized )   She  DISLIKES CHECKING SUGARS   No meter in hand   She expresses interest in eating muffins and  PNB  LOST  20 LBS       August 2021    Referred : by pcp  H/o diabetes for several  years   Current A1C is  9%  and symptoms/problems include polyuria and polydipsia   Current diabetic medications include toujeo 72 units a day and victoza 1.8 mg   Daily . Current monitoring regimen: rarely  Home blood sugar records: trend: fluctuating a lot  Any episodes of hypoglycemia? yes -        Review of Systems   None       Physical Exam   Constitutional: She is oriented to person, place, and time. She appears well-developed and well-nourished. She is using the walker   Psychiatric: She has a normal mood and affect. Lab Results   Component Value Date/Time    Hemoglobin A1c 6.5 (H) 09/07/2022 02:34 PM    Hemoglobin A1c 6.9 (H) 05/13/2022 03:16 PM    Hemoglobin A1c, External 8.6 03/31/2021 12:00 AM    Glucose 119 (H) 09/07/2022 02:34 PM    Microalbumin/Creat ratio (mg/g creat) 55 (H) 05/13/2022 03:16 PM    Microalbumin,urine random 4.47 05/13/2022 03:16 PM    LDL, calculated 63.4 09/07/2022 02:34 PM    Creatinine 1.94 (H) 09/07/2022 02:34 PM      Lab Results   Component Value Date/Time    Cholesterol, total 157 09/07/2022 02:34 PM    HDL Cholesterol 48 09/07/2022 02:34 PM    LDL, calculated 63.4 09/07/2022 02:34 PM    Triglyceride 228 (H) 09/07/2022 02:34 PM    CHOL/HDL Ratio 3.3 09/07/2022 02:34 PM     Lab Results   Component Value Date/Time    ALT (SGPT) 14 09/07/2022 02:34 PM    Alk.  phosphatase 113 09/07/2022 02:34 PM    Bilirubin, total 0.3 09/07/2022 02:34 PM    Albumin 3.8 09/07/2022 02:34 PM    Protein, total 6.8 09/07/2022 02:34 PM     Lab Results   Component Value Date/Time    GFR est non-AA 26 (L) 09/07/2022 02:34 PM    GFR est AA 31 (L) 09/07/2022 02:34 PM    Creatinine 1.94 (H) 09/07/2022 02:34 PM    BUN 22 (H) 09/07/2022 02:34 PM    Sodium 140 09/07/2022 02:34 PM    Potassium 4.4 09/07/2022 02:34 PM    Chloride 102 09/07/2022 02:34 PM    CO2 30 09/07/2022 02:34 PM           Assessment and  Plan       1. Type 2 DM uncontrolled :  a1c is   6.5 %   from   sept 2022    compared  yo   6.3 %  By POC     Compared to   7.2  %  August 2021     Compared to   9  %   From  Before         Sept 20222     No meter   or   no  log ( blind insulin shots )   She just refuses to check     Stay on toujeo, only basal   Along with ozempic @ 0.5 mg dose  and starlix 120 mg tid     Josh Aleena   is being seen for DM type 2 with nephropathy   Patient  Does not like   blood glucose checks a day utilizing the home BGM. Patient  uses  Subcutaneous  ONE    Insulin  shot to treat  diabetes. Patient does adjustments to the regimen by sliding scale or bolus wizard  on the basis of therapeutic CGM testing results          May 2022     No meter, no log , she dislikes checks   She did not gather any log  To get   CGM   Will have her get CGM  She is on basal insulin and nateglinide before each meal        November 2021     Too MANY LOW SUGARS  And that makes the a1c look better falsely   She dislikes to check sugars   Suggesting use of CGM ( afraid of technical problems )  Stay on victoza @ 1.8 mg a day    DECREASE  Toujeo   insulin  40    units  at bed time  Stay starlix  120 mg right before meals  She has lived a chaotic life and now with counseling she is doing a lot better and the meds seem higher             2. Hypoglycemia :  Educated on treating the hypoglycemia. No low sugars at all since changes made     3.  HTN : continue on current meds  - metoprolol, lisinopril     4. Dyslipidemia : continue crestor 20 mg     5. use of aspirin to prevent MI and TIA's discussed      6. Diabetic complications :     A. Retinopathy-NO   educated on this complication,  regular f/u with ophthalmologist encouraged    B. Nephropathy - YES  , Dr. Cm Ball is @ 1.94 - stable     C.  Peripheral Neuropathy - YES  , mild  educated on this disease and indicated improvement with good and stable glycemic control    D. CAD- Dr. Melvin fine @ [facility]           Reviewed results with patient and discussed the labs being ordered today/bnv  Patient voiced understanding of plan of care

## 2022-09-14 NOTE — LETTER
9/14/2022    Patient: Ivonne Sanches   YOB: 1950   Date of Visit: 9/14/2022     More Dash MD  [address] [place] [id number]  Via Fax: [phone]    Dear More Dash MD,      Thank you for referring Ms. Ivonne Sanches to [address] [place] [address] for evaluation. My notes for this consultation are attached. If you have questions, please do not hesitate to call me. I look forward to following your patient along with you.       Sincerely,    Bonnie Casas MD

## 2022-09-14 NOTE — PATIENT INSTRUCTIONS
SPECIFIC INSTRUCTIONS BELOW         DRINK water   Do not skip meals  Do not eat in between meals    Reduce carbs- pasta, rice, potatoes, bread   Try to avoid processed bread products like BISCUITS, CROISSANTS, MUFFINS    Do not drink juices or sodas, even if they are calorie zero or diet drinks and especially avoid using powders like crystalloids , STACEY-AIDS     Do not eat peanut butter     Do not eat sugar free cookies and cakes   Do not eat peaches, oranges, pineapples, raisins, grapes , canteloupe , honey dew, mangoes , watermelon  and fruit medleys    ------------------------------------------------------------------------------------------------------        Follow the steps  to get started on freestyle Norma 2  for Medicare patients       Keep a log of blood sugars by checking 4 times a day    you must be on insulin shots at least 3 times a day   3. Contact one of the following  DME suppliers  and find out who accepts your plan   4.  Give that supplier  our  office info  and fax number  ( [phone] [facility] )      ----------------------------------------------------------------------------------------------------      Check blood sugars immediately before each meal and at bedtime      ozempic       0. 5 mg   Once  A week          Toujeo   insulin  40   units  at bed time    Stay  on  Nateglinide  120 mg right before meals   Do not take it if you are not eating   Cut the pill to half if eating lesser than normal   Do not avoid lunches        Less than 70 mg NO INSULIN                  -------------PAY ATTENTION TO THESE GENERAL INSTRUCTIONS -----------------      - The medications prescribed at this visit will not be available at pharmacy until 6 pm       - YOUR MED LIST IS NOT UP TO DATE AS SOME CHANGES ARE BEING MADE AFTER THE VISIT - FOLLOW SPECIFIC INSTRUCTIONS  ABOVE     -ANY tests other than blood work, which you opt to do  outside the  [place] imaging facilities, you are responsible for prior authorizations if  required    - [address] UP TO DATE ON YOUR AVS- PLEASE IGNORE     Results     *Normal results will not be notified by a phone call starting January 1 2021   *If you have an upcoming visit, the results will be discussed at the visit   *Please sign up for MY CHART if you want access to your lab and test results  *Abnormal results which require immediate attention will be notified by phone call   *Abnormal results which do not require immediate assistance will be notified in 1-2 weeks       Refills    -    have your pharmacy send us a refill request . Refills are done max for one year and a visit is a must before refills are extended    Follow up appointments -  highly encourage you to make it when you are checking out. We can accommodate you into the schedule based on your clinical situation, but not for extending refills beyond a year. Labs are important to give refills and is important to get labs before the visit     Phone calls  -  Allow  24 hrs.  for non-urgent calls to be returned  Prior authorization - It may take 2-4 weeks to process  Forms  -  FMLA, DMV etc., will take up to 2 weeks to process  Cancellations - please notify the office 2 days in advance   Samples  - will only be dispensed at visits       If not showing for the appointments and cancelling appointments within 24 hours are kept track of and three  of such situations in  two consecutive years will likely be considered for termination from the practice    -------------------------------------------------------------------------------------------------------------------

## 2022-12-06 ENCOUNTER — TELEPHONE (OUTPATIENT)
Dept: ENDOCRINOLOGY | Age: 72
End: 2022-12-06

## 2022-12-06 NOTE — TELEPHONE ENCOUNTER
Called patient to advise her of Dr Aida Carl comments. Unable to reach patient and unable to leave voicemail message for return call as voicemail box is full.

## 2022-12-06 NOTE — TELEPHONE ENCOUNTER
I am hearing ozempic @ 0.5 mg is available , so ask patients to contact  the pharmacy  and call us with what is available     Then I can decide to make a change or not     Finally, stop the med and wait for it to be back     Theodore Michaels MD

## 2022-12-06 NOTE — TELEPHONE ENCOUNTER
Patient can not get Ozempic. She needs to know what to do now? She was suppose to take the medication last night. Please call patient and advise.

## 2022-12-07 ENCOUNTER — TELEPHONE (OUTPATIENT)
Dept: ENDOCRINOLOGY | Age: 72
End: 2022-12-07

## 2022-12-07 DIAGNOSIS — Z79.4 TYPE 2 DIABETES MELLITUS WITH HYPERGLYCEMIA, WITH LONG-TERM CURRENT USE OF INSULIN (HCC): ICD-10-CM

## 2022-12-07 DIAGNOSIS — E11.65 TYPE 2 DIABETES MELLITUS WITH HYPERGLYCEMIA, WITH LONG-TERM CURRENT USE OF INSULIN (HCC): ICD-10-CM

## 2022-12-07 RX ORDER — SEMAGLUTIDE 1.34 MG/ML
0.5 INJECTION, SOLUTION SUBCUTANEOUS
Qty: 3 BOX | Refills: 3 | Status: SHIPPED | OUTPATIENT
Start: 2022-12-07

## 2022-12-07 NOTE — TELEPHONE ENCOUNTER
Called patient and advised her of Dr Nelly Lucero comments. She verbalized understanding and states that she will call her pharmacy to see that they have available and return call with that information.

## 2022-12-07 NOTE — TELEPHONE ENCOUNTER
Patient asked for rx of ozempic to be sent to [facility] mail order which I believe is now [address] she wants to make sure there are refills on the rx

## 2022-12-07 NOTE — TELEPHONE ENCOUNTER
Called pt to assure her that her ozempic rx was ordered with refills. Pt did not have any concerns or questions at this time.

## 2022-12-16 DIAGNOSIS — Z79.4 TYPE 2 DIABETES MELLITUS WITH HYPERGLYCEMIA, WITH LONG-TERM CURRENT USE OF INSULIN (HCC): ICD-10-CM

## 2022-12-16 DIAGNOSIS — E11.65 TYPE 2 DIABETES MELLITUS WITH HYPERGLYCEMIA, WITH LONG-TERM CURRENT USE OF INSULIN (HCC): ICD-10-CM

## 2022-12-20 RX ORDER — SEMAGLUTIDE 1.34 MG/ML
INJECTION, SOLUTION SUBCUTANEOUS
OUTPATIENT
Start: 2022-12-20

## 2023-03-14 LAB
HBA1C MFR BLD HPLC: 6.8 %
LDL CHOLESTEROL, EXTERNAL: 70

## 2023-03-22 ENCOUNTER — OFFICE VISIT (OUTPATIENT)
Dept: ENDOCRINOLOGY | Age: 73
End: 2023-03-22

## 2023-03-22 VITALS
HEIGHT: 61 IN | DIASTOLIC BLOOD PRESSURE: 58 MMHG | OXYGEN SATURATION: 100 % | SYSTOLIC BLOOD PRESSURE: 104 MMHG | BODY MASS INDEX: 42.48 KG/M2 | TEMPERATURE: 96.7 F | WEIGHT: 225 LBS | HEART RATE: 78 BPM

## 2023-03-22 DIAGNOSIS — E11.65 TYPE 2 DIABETES MELLITUS WITH HYPERGLYCEMIA, WITH LONG-TERM CURRENT USE OF INSULIN (HCC): Primary | ICD-10-CM

## 2023-03-22 DIAGNOSIS — Z79.4 TYPE 2 DIABETES MELLITUS WITH HYPERGLYCEMIA, WITH LONG-TERM CURRENT USE OF INSULIN (HCC): Primary | ICD-10-CM

## 2023-03-22 DIAGNOSIS — E55.9 VITAMIN D DEFICIENCY: ICD-10-CM

## 2023-03-22 DIAGNOSIS — E78.2 MIXED HYPERLIPIDEMIA: ICD-10-CM

## 2023-03-22 DIAGNOSIS — N18.32 STAGE 3B CHRONIC KIDNEY DISEASE (HCC): ICD-10-CM

## 2023-03-22 DIAGNOSIS — I10 ESSENTIAL HYPERTENSION: ICD-10-CM

## 2023-03-22 RX ORDER — SEMAGLUTIDE 1.34 MG/ML
1 INJECTION, SOLUTION SUBCUTANEOUS
Qty: 3 EACH | Refills: 3 | Status: SHIPPED | OUTPATIENT
Start: 2023-03-22

## 2023-03-22 RX ORDER — INSULIN GLARGINE 300 U/ML
INJECTION, SOLUTION SUBCUTANEOUS
Qty: 4.5 ML | Refills: 6 | Status: SHIPPED | OUTPATIENT
Start: 2023-03-22

## 2023-03-22 NOTE — LETTER
3/23/2023    Patient: Joana Prater   YOB: 1950   Date of Visit: 3/22/2023     Liyah Pereira MD  [address] [place] [phone]  Via Fax: [phone]    Dear Liyah Pereira MD,      Thank you for referring Ms. Jaona Prater to [address] [place] [address] for evaluation. My notes for this consultation are attached. If you have questions, please do not hesitate to call me. I look forward to following your patient along with you.       Sincerely,    Katlyn Covarrubias MD

## 2023-03-22 NOTE — PROGRESS NOTES
Stephanie Nobles is a 81 y.o. female here for   Chief Complaint   Patient presents with    Diabetes    Vitamin D Deficiency       1. Have you been to the ER, urgent care clinic since your last visit? Hospitalized since your last visit? - No     2. Have you seen or consulted any other health care providers outside of the [address] [place] [address] since your last visit?   Include any pap smears or colon screening.- PCP

## 2023-03-22 NOTE — PATIENT INSTRUCTIONS
SPECIFIC INSTRUCTIONS BELOW           DRINK water   Do not skip meals  Do not eat in between meals    Reduce carbs- pasta, rice, potatoes, bread   Try to avoid processed bread products like BISCUITS, CROISSANTS, MUFFINS    Do not drink juices or sodas, even if they are calorie zero or diet drinks and especially avoid using powders like crystalloids , STACEY-AIDS     Do not eat peanut butter     Do not eat sugar free cookies and cakes   Do not eat peaches, oranges, pineapples, raisins, grapes , canteloupe , honey dew, mangoes , watermelon  and fruit medleys    ------------------------------------------------------------------------------------------------------        Follow the steps  to get started on freestyle Norma 2  for Medicare patients       Keep a log of blood sugars by checking 4 times a day    you must be on insulin shots at least 3 times a day   3. Contact one of the following  DME suppliers  and find out who accepts your plan   4.  Give that supplier  our  office info  and fax number  ( [phone] [facility] )      ----------------------------------------------------------------------------------------------------      Check blood sugars immediately before each meal and at bedtime        Increase  ozempic  to 1  mg   Once  A week       Toujeo   insulin  40   units  at bed time    Stay  on  Nateglinide  120 mg right before meals   Do not take it if you are not eating   Cut the pill to half if eating lesser than normal   Do not avoid lunches        Less than 70 mg NO INSULIN            -------------PAY ATTENTION TO THESE GENERAL INSTRUCTIONS -----------------      - The medications prescribed at this visit will not be available at pharmacy until 6 pm       - YOUR MED LIST IS NOT UP TO DATE AS SOME CHANGES ARE BEING MADE AFTER THE VISIT - FOLLOW SPECIFIC INSTRUCTIONS  ABOVE     -ANY tests other than blood work, which you opt to do  outside the  [place] imaging facilities, you are responsible for prior authorizations if  required    - 18 [address] Palafox UP TO DATE ON YOUR AVS- PLEASE IGNORE     Results     *Normal results will not be notified by a phone call starting January 1 2021   *If you have an upcoming visit, the results will be discussed at the visit   *Please sign up for MY CHART if you want access to your lab and test results  *Abnormal results which require immediate attention will be notified by phone call   *Abnormal results which do not require immediate assistance will be notified in 1-2 weeks       Refills    -    have your pharmacy send us a refill request . Refills are done max for one year and a visit is a must before refills are extended    Follow up appointments -  highly encourage you to make it when you are checking out. We can accommodate you into the schedule based on your clinical situation, but not for extending refills beyond a year. Labs are important to give refills and is important to get labs before the visit     Phone calls  -  Allow  24 hrs.  for non-urgent calls to be returned  Prior authorization - It may take 2-4 weeks to process  Forms  -  FMLA, DMV etc., will take up to 2 weeks to process  Cancellations - please notify the office 2 days in advance   Samples  - will only be dispensed at visits       If not showing for the appointments and cancelling appointments within 24 hours are kept track of and three  of such situations in  two consecutive years will likely be considered for termination from the practice    -------------------------------------------------------------------------------------------------------------------

## 2023-03-22 NOTE — PROGRESS NOTES
[facility]  Laury Verdugo MD, Herminia Perez is a 81 y.o. female presents today as a follow up     Patient here for  FOLLOW UP VISIT  after last  visit  For  Type 2 diabetes mellitus  From  sept 2022     She did not get CGM  She is not checking sugars, no meter in hand       Sept 2022     LOST 10 lbs   She is feeling exhausted   She has nausea and constipation       May 2022    No meter , No labs     Nov 2021   She had Many hypoglycemic   Episodes ( some went unrecognized )   She  DISLIKES CHECKING SUGARS   No meter in hand   She expresses interest in eating muffins and  PNB  LOST  20 LBS       August 2021    Referred : by pcp  H/o diabetes for several  years   Current A1C is  9%  and symptoms/problems include polyuria and polydipsia   Current diabetic medications include toujeo 72 units a day and victoza 1.8 mg   Daily . Current monitoring regimen: rarely  Home blood sugar records: trend: fluctuating a lot  Any episodes of hypoglycemia? yes -        Review of Systems   None       Physical Exam   Constitutional: She is oriented to person, place, and time. She appears well-developed and well-nourished. She is using the walker   Psychiatric: She has a normal mood and affect. Reviewed labs from pcp march 2023         Assessment and  Plan       1. Type 2 DM uncontrolled :  a1c is  6.8 %    from    pcp   march 14 2023   compared to     6.5 %   from   sept 2022    compared  yo   6.3 %  By POC     Compared to   7.2  %  August 2021     Compared to   9  %   From  Before       March 2023     No meter   or   no  log ( blind insulin shots )   She just refuses to check   Stay on toujeo, only basal   Increase   ozempic @ 1 mg dose  and starlix 120 mg tid     Jaret Blackwood   is being seen for DM type 2    Patient  performs 4 times of blood glucose checks a day utilizing the home BGM. Patient  uses  subcutaneous 4  Insulin  shots   therapy to treat  diabetes.   Patient does adjustments to the regimen by sliding scale or bolus wizard  on the basis of therapeutic CGM testing results         Sept 2022     No meter   or   no  log ( blind insulin shots )   She just refuses to check   Stay on toujeo, only basal   Along with ozempic @ 0.5 mg dose  and starlix 120 mg tid           2. Hypoglycemia :  Educated on treating the hypoglycemia. No low sugars at all since changes made     3. HTN : continue on current meds  - metoprolol, lisinopril     4. Dyslipidemia : continue crestor 20 mg     5. use of aspirin to prevent MI and TIA's discussed      6. Diabetic complications :     A. Retinopathy-NO   educated on this complication,  regular f/u with ophthalmologist encouraged    B. Nephropathy - YES  , Dr. Chantel Galeas is @   1.6   from  march 2023   compared to 1.94 , better     C.  Peripheral Neuropathy - YES  , mild  educated on this disease and indicated improvement with good and stable glycemic control    D. CAD- Dr. Dean Sanchez @ [facility]           Reviewed results with patient and discussed the labs being ordered today/bnv  Patient voiced understanding of plan of care

## 2023-05-22 RX ORDER — INSULIN GLARGINE 300 U/ML
INJECTION, SOLUTION SUBCUTANEOUS
COMMUNITY
Start: 2023-03-22

## 2023-05-22 RX ORDER — METOPROLOL SUCCINATE 50 MG/1
75 TABLET, EXTENDED RELEASE ORAL DAILY
COMMUNITY
Start: 2021-08-04

## 2023-05-22 RX ORDER — LISINOPRIL 2.5 MG/1
2.5 TABLET ORAL DAILY
COMMUNITY
Start: 2021-08-04

## 2023-05-22 RX ORDER — SEMAGLUTIDE 1.34 MG/ML
1 INJECTION, SOLUTION SUBCUTANEOUS
COMMUNITY
Start: 2023-03-22

## 2023-05-22 RX ORDER — ALLOPURINOL 100 MG/1
100 TABLET ORAL DAILY
COMMUNITY
Start: 2021-08-04

## 2023-05-22 RX ORDER — GABAPENTIN 300 MG/1
CAPSULE ORAL
COMMUNITY
Start: 2021-08-04

## 2023-05-22 RX ORDER — SEMAGLUTIDE 1.34 MG/ML
0.5 INJECTION, SOLUTION SUBCUTANEOUS
COMMUNITY
Start: 2022-12-07

## 2023-05-22 RX ORDER — ROSUVASTATIN CALCIUM 20 MG/1
TABLET, COATED ORAL
COMMUNITY
Start: 2022-04-19

## 2023-05-22 RX ORDER — SENNA PLUS 8.6 MG/1
1 TABLET ORAL NIGHTLY
COMMUNITY

## 2023-05-22 RX ORDER — CETIRIZINE HYDROCHLORIDE 10 MG/1
10 TABLET ORAL EVERY EVENING
COMMUNITY
Start: 2022-04-07

## 2023-05-22 RX ORDER — NATEGLINIDE 120 MG/1
120 TABLET ORAL
COMMUNITY
Start: 2022-09-14

## 2023-05-22 RX ORDER — BUMETANIDE 0.5 MG/1
0.5 TABLET ORAL DAILY
COMMUNITY
Start: 2022-04-13

## 2023-07-14 LAB — HBA1C MFR BLD HPLC: 6.2 %

## 2023-09-18 RX ORDER — INSULIN GLARGINE 300 U/ML
INJECTION, SOLUTION SUBCUTANEOUS
Qty: 9 EACH | Refills: 1 | Status: SHIPPED | OUTPATIENT
Start: 2023-09-18

## 2023-09-28 RX ORDER — NATEGLINIDE 120 MG/1
TABLET ORAL
Qty: 270 TABLET | Refills: 3 | Status: SHIPPED | OUTPATIENT
Start: 2023-09-28

## 2023-10-20 ENCOUNTER — OFFICE VISIT (OUTPATIENT)
Age: 73
End: 2023-10-20

## 2023-10-20 VITALS
HEIGHT: 61 IN | DIASTOLIC BLOOD PRESSURE: 71 MMHG | OXYGEN SATURATION: 99 % | WEIGHT: 220.2 LBS | TEMPERATURE: 97.2 F | SYSTOLIC BLOOD PRESSURE: 114 MMHG | RESPIRATION RATE: 18 BRPM | HEART RATE: 78 BPM | BODY MASS INDEX: 41.57 KG/M2

## 2023-10-20 DIAGNOSIS — E78.2 MIXED HYPERLIPIDEMIA: ICD-10-CM

## 2023-10-20 DIAGNOSIS — E11.65 TYPE 2 DIABETES MELLITUS WITH HYPERGLYCEMIA, WITH LONG-TERM CURRENT USE OF INSULIN (HCC): Primary | ICD-10-CM

## 2023-10-20 DIAGNOSIS — Z79.4 TYPE 2 DIABETES MELLITUS WITH HYPERGLYCEMIA, WITH LONG-TERM CURRENT USE OF INSULIN (HCC): Primary | ICD-10-CM

## 2023-10-20 DIAGNOSIS — N18.32 CHRONIC KIDNEY DISEASE, STAGE 3B (HCC): ICD-10-CM

## 2023-10-20 LAB — HBA1C MFR BLD: 6.5 %

## 2023-10-20 RX ORDER — ROSUVASTATIN CALCIUM 20 MG/1
TABLET, COATED ORAL
Qty: 90 TABLET | Refills: 5 | Status: SHIPPED | OUTPATIENT
Start: 2023-10-20

## 2023-10-20 NOTE — PATIENT INSTRUCTIONS
DRINK water   Do not skip meals  Do not eat in between meals     Reduce carbs- pasta, rice, potatoes, bread   Try to avoid processed bread products like BISCUITS, CROISSANTS, MUFFINS     Do not drink juices or sodas, even if they are calorie zero or diet drinks and especially avoid using powders like crystalloids , DAIJA-AIDS      Do not eat peanut butter      Do not eat sugar free cookies and cakes   Do not eat peaches, oranges, pineapples, raisins, grapes , canteloupe , honey dew, mangoes , watermelon  and fruit medleys     ------------------------------------------------------------------------------------------------------        Check blood sugars immediately before each meal and at bedtime        ozempic  to 1  mg   Once  A week        Decrease  Toujeo   insulin  20   units  at bed time  ( DNCP )      Stay  on  Nateglinide  120 mg right before meals   Do not take it if you are not eating   Cut the pill to half if eating lesser than normal   Do not avoid lunches         Less than 70 mg NO INSULIN                 -------------PAY ATTENTION TO THESE GENERAL INSTRUCTIONS -----------------        - The medications prescribed at this visit will not be available at pharmacy until 6 pm         - YOUR MED LIST IS NOT UP TO DATE AS SOME CHANGES ARE BEING MADE AFTER THE VISIT - FOLLOW SPECIFIC INSTRUCTIONS  ABOVE      -ANY tests other than blood work, which you opt to do  outside the  [facility] imaging facilities, you are responsible for prior authorizations if  required     - [id number] UP TO DATE ON YOUR AVS- PLEASE IGNORE      Results      *Normal results will not be notified by a phone call starting January 1 2021   *If you have an upcoming visit, the results will be discussed at the visit   *Please sign up for MY CHART if you want access to your lab and test results  *Abnormal results which require immediate attention will be notified by phone call   *Abnormal results which do not

## 2023-10-20 NOTE — PROGRESS NOTES
Rony Rueda is a 72 y.o. female here for   Chief Complaint   Patient presents with    Diabetes       1. Have you been to the ER, urgent care clinic since your last visit? Hospitalized since your last visit? - no    2. Have you seen or consulted any other health care providers outside of the [address] [place] [address] since your last visit?   Include any pap smears or colon screening.- PCP

## 2023-10-20 NOTE — PROGRESS NOTES
Care Diabetes [facility] Endocrinology   [facility]  MD, Tiffany Zuleta is a 72 y.o.  female presents today as a follow up       Patient here for  FOLLOW UP VISIT  after last  visit  For  Type 2 diabetes mellitus  From  March 2023     Lost 5 lbs   Likes dexcom         March 2023       She did not get CGM   She is not checking sugars, no meter in hand          Sept 2022       LOST 10 lbs    She is feeling exhausted    She has nausea and constipation          May 2022     No meter , No labs       Nov 2021    She had Many hypoglycemic   Episodes ( some went unrecognized )    She  DISLIKES CHECKING SUGARS    No meter in hand    She expresses interest in eating muffins and  PNB   LOST  20 LBS          August 2021     Referred : by pcp   H/o diabetes for several  years    Current A1C is  9%  and symptoms/problems include polyuria and polydipsia    Current diabetic medications include toujeo 72 units a day and victoza 1.8 mg   Daily . Current monitoring regimen: rarely   Home blood sugar records: trend: fluctuating a lot   Any episodes of hypoglycemia? yes -           Review of Systems    None          Physical Exam    Constitutional: She is oriented to person, place, and time. She appears well-developed and well-nourished. She is in W/C   Psychiatric: She has a normal mood and affect. Labs reviewed from august 2023             Assessment and  Plan          1.   Type 2 DM uncontrolled :  a1c is   6.5 %      from today      compared to     6.8 %    from    pcp   march 14 2023   compared  to     6.5 %   from   sept 2022    compared  yo   6.3 %  By POC      Compared to   7.2  %  August 2021     Compared to   9  %   From  Before        She is doing well on basal insulin , ozempic  and starlix     Reviewed   [facility] clarity downloaded report for 14 days and discussed with pt    - dexcom - a1c is 6.7 % for 14 days   - 14 day average glucose 1451  - 13 % for high and < 1 low sugars and % in

## 2024-03-15 LAB
HBA1C MFR BLD HPLC: 6.6 %
LDL CHOLESTEROL, EXTERNAL: 71

## 2024-04-24 ENCOUNTER — OFFICE VISIT (OUTPATIENT)
Age: 74
End: 2024-04-24
Payer: MEDICARE

## 2024-04-24 VITALS
DIASTOLIC BLOOD PRESSURE: 74 MMHG | OXYGEN SATURATION: 98 % | HEART RATE: 69 BPM | HEIGHT: 61 IN | WEIGHT: 210 LBS | TEMPERATURE: 97.3 F | BODY MASS INDEX: 39.65 KG/M2 | SYSTOLIC BLOOD PRESSURE: 122 MMHG

## 2024-04-24 DIAGNOSIS — Z79.4 LONG TERM (CURRENT) USE OF INSULIN (HCC): ICD-10-CM

## 2024-04-24 DIAGNOSIS — Z79.4 TYPE 2 DIABETES MELLITUS WITH HYPERGLYCEMIA, WITH LONG-TERM CURRENT USE OF INSULIN (HCC): Primary | ICD-10-CM

## 2024-04-24 DIAGNOSIS — N18.32 CHRONIC KIDNEY DISEASE, STAGE 3B (HCC): ICD-10-CM

## 2024-04-24 DIAGNOSIS — E78.2 MIXED HYPERLIPIDEMIA: ICD-10-CM

## 2024-04-24 DIAGNOSIS — E11.65 TYPE 2 DIABETES MELLITUS WITH HYPERGLYCEMIA, WITH LONG-TERM CURRENT USE OF INSULIN (HCC): Primary | ICD-10-CM

## 2024-04-24 PROCEDURE — G8400 PT W/DXA NO RESULTS DOC: HCPCS | Performed by: INTERNAL MEDICINE

## 2024-04-24 PROCEDURE — 95251 CONT GLUC MNTR ANALYSIS I&R: CPT | Performed by: INTERNAL MEDICINE

## 2024-04-24 PROCEDURE — 1036F TOBACCO NON-USER: CPT | Performed by: INTERNAL MEDICINE

## 2024-04-24 PROCEDURE — 1090F PRES/ABSN URINE INCON ASSESS: CPT | Performed by: INTERNAL MEDICINE

## 2024-04-24 PROCEDURE — 3046F HEMOGLOBIN A1C LEVEL >9.0%: CPT | Performed by: INTERNAL MEDICINE

## 2024-04-24 PROCEDURE — G8417 CALC BMI ABV UP PARAM F/U: HCPCS | Performed by: INTERNAL MEDICINE

## 2024-04-24 PROCEDURE — 3017F COLORECTAL CA SCREEN DOC REV: CPT | Performed by: INTERNAL MEDICINE

## 2024-04-24 PROCEDURE — 99214 OFFICE O/P EST MOD 30 MIN: CPT | Performed by: INTERNAL MEDICINE

## 2024-04-24 PROCEDURE — G8427 DOCREV CUR MEDS BY ELIG CLIN: HCPCS | Performed by: INTERNAL MEDICINE

## 2024-04-24 PROCEDURE — 2022F DILAT RTA XM EVC RTNOPTHY: CPT | Performed by: INTERNAL MEDICINE

## 2024-04-24 PROCEDURE — 1123F ACP DISCUSS/DSCN MKR DOCD: CPT | Performed by: INTERNAL MEDICINE

## 2024-04-24 RX ORDER — INSULIN GLARGINE 300 U/ML
INJECTION, SOLUTION SUBCUTANEOUS
Qty: 9 ML | Refills: 3 | Status: SHIPPED | OUTPATIENT
Start: 2024-04-24

## 2024-04-24 NOTE — PROGRESS NOTES
Care Diabetes [facility] Endocrinology   Thalia Lopez MD, FACE          Ene Mathis is a 73 y.o.  female presents today as a follow up       Patient here for  FOLLOW UP VISIT  after last  visit  For  Type 2 diabetes mellitus  From  October 2023     Pt is experiencing GI distress  and she thinks this could be from using Ozempic   She lost 10 lbs   Her  has been extremely concerned about this and he is advising her to not to continue on this medication       October 2023   Lost 5 lbs   Likes dexcom     March 2023     She did not get CGM   She is not checking sugars, no meter in hand          Nov 2021    She had Many hypoglycemic   Episodes ( some went unrecognized )    She  DISLIKES CHECKING SUGARS    No meter in hand    She expresses interest in eating muffins and  PNB   LOST  20 LBS          August 2021     Referred : by pcp   H/o diabetes for several  years    Current A1C is  9%  and symptoms/problems include polyuria and polydipsia    Current diabetic medications include toujeo 72 units a day and victoza 1.8 mg   Daily .    Current monitoring regimen: rarely   Home blood sugar records: trend: fluctuating a lot   Any episodes of hypoglycemia? yes -           Review of Systems    None          Physical Exam    Constitutional: She is oriented to person, place, and time. She appears well-developed and well-nourished.    She is in W/C   Psychiatric: She has a normal mood and affect.          Labs    Reviewed  from  pcp march 2024           Assessment and  Plan          1.  Type 2 DM uncontrolled :  a1c is    6.6 %   from  March 2024  compared  to      6.5 %      from   October 2023       compared to     6.8 %    from    pcp   march 14 2023   compared  to     6.5 %   from   sept 2022    compared  yo   6.3 %  By POC      Compared to   7.2  %  August 2021     Compared to   9  %   From  Before          April 2024     She is maintaining A1c, but she seems to be worried about the small elevation   (

## 2024-04-24 NOTE — PATIENT INSTRUCTIONS
assistance will be notified in 1-2 weeks         Refills    -    have your pharmacy send us a refill request . Refills are done max for one year and a visit is a must before refills are extended     Follow up appointments -  highly encourage you to make it when you are checking out. We can accommodate you into the schedule based on your clinical situation, but not for extending refills beyond a year. Labs are important to give refills and is important to get labs before the visit     Phone calls  -  Allow  24 hrs. for non-urgent calls to be returned  Prior authorization - It may take 2-4 weeks to process  Forms  -  FMLA, DMV etc., will take up to 2 weeks to process  Cancellations - please notify the office 2 days in advance   Samples  - will only be dispensed at visits         If not showing for the appointments and cancelling appointments within 24 hours are kept track of and three  of such situations in  two consecutive years will likely be considered for termination from the practice     -------------------------------------------------------------------------------------------------------------------

## 2024-07-09 DIAGNOSIS — Z79.4 TYPE 2 DIABETES MELLITUS WITH HYPERGLYCEMIA, WITH LONG-TERM CURRENT USE OF INSULIN (HCC): Primary | ICD-10-CM

## 2024-07-09 DIAGNOSIS — E11.65 TYPE 2 DIABETES MELLITUS WITH HYPERGLYCEMIA, WITH LONG-TERM CURRENT USE OF INSULIN (HCC): Primary | ICD-10-CM

## 2024-07-09 NOTE — TELEPHONE ENCOUNTER
Patient called stating she needs new prescription to be signed with [facility] for Dexcom sensors. Patient states [facility] sent request.

## 2024-07-09 NOTE — TELEPHONE ENCOUNTER
Mail box full unable to leave message.    Dexcom not on medication list. Need to know which Dexcom she uses.

## 2024-07-10 RX ORDER — ACYCLOVIR 400 MG/1
TABLET ORAL
Qty: 9 EACH | Refills: 3 | Status: SHIPPED | OUTPATIENT
Start: 2024-07-10

## 2024-07-10 NOTE — TELEPHONE ENCOUNTER
Mail box full unable to leave message.   Called Vernon Mathis spouse, telephone rang 11 times no answering machine. Unable to leave message.    Spouse    [phone]

## 2024-07-10 NOTE — TELEPHONE ENCOUNTER
Reached  he is going to check wife's phone requested call back to Wife's phone.     Mail box full unable to leave message. Continues.

## 2024-07-13 LAB — HBA1C MFR BLD HPLC: 6.4 %

## 2024-09-24 ENCOUNTER — OFFICE VISIT (OUTPATIENT)
Age: 74
End: 2024-09-24

## 2024-09-24 VITALS
DIASTOLIC BLOOD PRESSURE: 79 MMHG | HEART RATE: 78 BPM | BODY MASS INDEX: 39.69 KG/M2 | WEIGHT: 210.2 LBS | TEMPERATURE: 97.5 F | OXYGEN SATURATION: 97 % | HEIGHT: 61 IN | SYSTOLIC BLOOD PRESSURE: 115 MMHG

## 2024-09-24 DIAGNOSIS — N18.32 CHRONIC KIDNEY DISEASE, STAGE 3B (HCC): ICD-10-CM

## 2024-09-24 DIAGNOSIS — E11.65 TYPE 2 DIABETES MELLITUS WITH HYPERGLYCEMIA, WITH LONG-TERM CURRENT USE OF INSULIN (HCC): Primary | ICD-10-CM

## 2024-09-24 DIAGNOSIS — I10 ESSENTIAL (PRIMARY) HYPERTENSION: ICD-10-CM

## 2024-09-24 DIAGNOSIS — Z79.4 LONG TERM (CURRENT) USE OF INSULIN (HCC): ICD-10-CM

## 2024-09-24 DIAGNOSIS — E78.2 MIXED HYPERLIPIDEMIA: ICD-10-CM

## 2024-09-24 DIAGNOSIS — Z79.4 TYPE 2 DIABETES MELLITUS WITH HYPERGLYCEMIA, WITH LONG-TERM CURRENT USE OF INSULIN (HCC): Primary | ICD-10-CM

## 2024-09-24 RX ORDER — SEMAGLUTIDE 2.68 MG/ML
INJECTION, SOLUTION SUBCUTANEOUS
Qty: 9 ML | Refills: 3
Start: 2024-09-24

## 2024-09-24 RX ORDER — ROSUVASTATIN CALCIUM 20 MG/1
TABLET, COATED ORAL
Qty: 90 TABLET | Refills: 3 | Status: SHIPPED | OUTPATIENT
Start: 2024-09-24

## 2025-03-10 LAB
GFR, ESTIMATED: 27
HBA1C MFR BLD HPLC: 6.3 %

## 2025-05-02 ENCOUNTER — OFFICE VISIT (OUTPATIENT)
Age: 75
End: 2025-05-02

## 2025-05-02 VITALS
SYSTOLIC BLOOD PRESSURE: 129 MMHG | DIASTOLIC BLOOD PRESSURE: 60 MMHG | OXYGEN SATURATION: 98 % | WEIGHT: 215 LBS | HEART RATE: 68 BPM | HEIGHT: 61 IN | TEMPERATURE: 97.7 F | BODY MASS INDEX: 40.59 KG/M2

## 2025-05-02 DIAGNOSIS — E11.65 TYPE 2 DIABETES MELLITUS WITH HYPERGLYCEMIA, WITH LONG-TERM CURRENT USE OF INSULIN (HCC): ICD-10-CM

## 2025-05-02 DIAGNOSIS — I10 ESSENTIAL (PRIMARY) HYPERTENSION: ICD-10-CM

## 2025-05-02 DIAGNOSIS — E78.2 MIXED HYPERLIPIDEMIA: Primary | ICD-10-CM

## 2025-05-02 DIAGNOSIS — Z79.4 TYPE 2 DIABETES MELLITUS WITH HYPERGLYCEMIA, WITH LONG-TERM CURRENT USE OF INSULIN (HCC): ICD-10-CM

## 2025-05-02 DIAGNOSIS — Z79.4 LONG TERM (CURRENT) USE OF INSULIN (HCC): ICD-10-CM

## 2025-05-02 DIAGNOSIS — R10.84 GENERALIZED ABDOMINAL PAIN: ICD-10-CM

## 2025-05-02 RX ORDER — INSULIN GLARGINE 300 U/ML
INJECTION, SOLUTION SUBCUTANEOUS
Qty: 9 ML | Refills: 3 | Status: SHIPPED | OUTPATIENT
Start: 2025-05-02

## 2025-05-02 RX ORDER — NATEGLINIDE 120 MG/1
TABLET ORAL
Qty: 270 TABLET | Refills: 3 | Status: SHIPPED | OUTPATIENT
Start: 2025-05-02

## 2025-05-02 RX ORDER — ACYCLOVIR 400 MG/1
TABLET ORAL
Qty: 9 EACH | Refills: 3 | Status: SHIPPED | OUTPATIENT
Start: 2025-05-02

## 2025-05-02 NOTE — PROGRESS NOTES
Ene Mathis is a 74 y.o. female here for   Chief Complaint   Patient presents with    Diabetes       1. Have you been to the ER, urgent care clinic since your last visit?  Hospitalized since your last visit? -[facility] Stomach upset 2/2025, [place] fall cut lip 03/2025    2. Have you seen or consulted any other health care providers outside of the [facility] since your last visit?  Include any pap smears or colon screening.-Dr. Choe, [facility] orthopedics

## 2025-05-02 NOTE — PATIENT INSTRUCTIONS
DRINK water   Do not skip meals  Do not eat in between meals     Reduce carbs- pasta, rice, potatoes, bread   Try to avoid processed bread products like BISCUITS, CROISSANTS, MUFFINS     Do not drink juices or sodas, even if they are calorie zero or diet drinks and especially avoid using powders like crystalloids , DAIJA-AIDS      Do not eat peanut butter      Do not eat sugar free cookies and cakes   Do not eat peaches, oranges, pineapples, raisins, grapes , canteloupe , honey dew, mangoes , watermelon  and fruit medleys     ------------------------------------------------------------------------------------------------------        Check blood sugars immediately before each meal and at bedtime        ozempic  to 2   mg   Once  A week   (  pt  assistance )        Toujeo   insulin  20   units  at bed time        Stay  on  Nateglinide  120 mg right before meals   Do not take it if you are not eating   Cut the pill to half if eating lesser than normal   Do not avoid lunches         Less than 70 mg NO INSULIN                 -------------PAY ATTENTION TO THESE GENERAL INSTRUCTIONS -----------------        - The medications prescribed at this visit will not be available at pharmacy until 6 pm         - YOUR MED LIST IS NOT UP TO DATE AS SOME CHANGES ARE BEING MADE AFTER THE VISIT - FOLLOW SPECIFIC INSTRUCTIONS  ABOVE      -ANY tests other than blood work, which you opt to do  outside the  [facility] imaging facilities, you are responsible for prior authorizations if  required     - HEALTH MAINTENANCE IS NOT GOING TO BE UP TO DATE ON YOUR AVS- PLEASE IGNORE      Results      *Normal results will not be notified by a phone call starting January 1 2021   *If you have an upcoming visit, the results will be discussed at the visit   *Please sign up for MY CHART if you want access to your lab and test results  *Abnormal results which require immediate attention will be notified by phone call   *Abnormal results which do

## 2025-05-02 NOTE — PROGRESS NOTES
[facility] Diabetes [facility] End[facility]inology   Thalia Lopez MD, FACE          Ene Mathis is a 74  y.o.  female presents today as a follow up       Patient here for  FOLLOW UP VISIT  after last  visit  For  Type 2 diabetes mellitus  From Sept 2024       Gained 5 lbs   Pt could not get ozempic  for   2 months   She is struggling with her  diarrhea  and   PCP is evaluating          Sept 2024      Stable weight   She wants to lose more weight    She is getting OZEMPIC 1 mg  as Patient Assistance         April 2024      Pt is experiencing GI distress  and she thinks this could be from using Ozempic   She lost 10 lbs   Her  has been extremely concerned about this and he is advising her to not to continue on this medication      Nov 2021    She had Many hypoglycemic   Episodes ( some went unrecognized )    She  DISLIKES CHECKING SUGARS    No meter in hand    She expresses interest in eating muffins and  PNB   LOST  20 LBS          August 2021     Referred : by pcp   H/o diabetes for several  years    Current A1C is  9%  and symptoms/problems include polyuria and polydipsia    Current diabetic medications include toujeo 72 units a day and victoza 1.8 mg   Daily .    Current monitoring regimen: rarely   Home blood sugar records: trend: fluctuating a lot   Any episodes of hypoglycemia? yes -           Review of Systems    None          Physical Exam    Constitutional: She is oriented to person, place, and time. She appears well-developed and well-nourished.    She is using walker     Psychiatric: She has a normal mood and affect.          Labs    Reviewed  from  pcp   April 2025   ( egfr  is  29 ml/min   and  creat  is  1.85  )            Assessment and  Plan          1.  Type 2 DM uncontrolled :  a1c is  6.3 %    from  March 2025     compared  to    6.4   %      from   July 2024      compared    to     6.6 %   from  March 2024  compared  to      6.5 %      from   October 2023       compared to     6.8 %

## 2025-05-16 ENCOUNTER — TELEPHONE (OUTPATIENT)
Age: 75
End: 2025-05-16

## 2025-05-16 NOTE — TELEPHONE ENCOUNTER
Attempted to call. Unsuccessful. Left msg for Ene Mathis to give us a call back at the office. A callback number was left.

## 2025-05-27 ENCOUNTER — TELEPHONE (OUTPATIENT)
Age: 75
End: 2025-05-27

## 2025-05-27 NOTE — TELEPHONE ENCOUNTER
Hi,    Pt says, she forgot who Dr. Lopez referred her to see for a CT on her abdomen - she cannot locate her previous paperwork and is requesting to speak with a Nurse for clarification. Please return call to inform.    Thank you.

## 2025-05-28 NOTE — TELEPHONE ENCOUNTER
Attempted to call. Unsuccessful. Unable to leave message mailbox is full. Sent a [employer] message.

## 2025-05-30 DIAGNOSIS — E11.65 TYPE 2 DIABETES MELLITUS WITH HYPERGLYCEMIA, WITH LONG-TERM CURRENT USE OF INSULIN (HCC): ICD-10-CM

## 2025-05-30 DIAGNOSIS — Z79.4 TYPE 2 DIABETES MELLITUS WITH HYPERGLYCEMIA, WITH LONG-TERM CURRENT USE OF INSULIN (HCC): ICD-10-CM

## 2025-05-30 DIAGNOSIS — Z79.4 LONG TERM (CURRENT) USE OF INSULIN (HCC): ICD-10-CM

## 2025-06-02 RX ORDER — INSULIN GLARGINE 300 U/ML
INJECTION, SOLUTION SUBCUTANEOUS
Qty: 4.5 ML | Refills: 3 | Status: SHIPPED | OUTPATIENT
Start: 2025-06-02